# Patient Record
Sex: FEMALE | Race: BLACK OR AFRICAN AMERICAN | NOT HISPANIC OR LATINO | URBAN - METROPOLITAN AREA
[De-identification: names, ages, dates, MRNs, and addresses within clinical notes are randomized per-mention and may not be internally consistent; named-entity substitution may affect disease eponyms.]

---

## 2024-02-28 ENCOUNTER — HOSPITAL ENCOUNTER (EMERGENCY)
Facility: HOSPITAL | Age: 34
Discharge: HOME | End: 2024-02-28
Payer: COMMERCIAL

## 2024-02-28 ENCOUNTER — APPOINTMENT (OUTPATIENT)
Dept: RADIOLOGY | Facility: HOSPITAL | Age: 34
End: 2024-02-28
Payer: COMMERCIAL

## 2024-02-28 VITALS
TEMPERATURE: 97.2 F | WEIGHT: 250 LBS | RESPIRATION RATE: 15 BRPM | DIASTOLIC BLOOD PRESSURE: 75 MMHG | SYSTOLIC BLOOD PRESSURE: 121 MMHG | HEART RATE: 74 BPM | OXYGEN SATURATION: 99 %

## 2024-02-28 DIAGNOSIS — O20.0 THREATENED MISCARRIAGE IN EARLY PREGNANCY (HHS-HCC): ICD-10-CM

## 2024-02-28 DIAGNOSIS — N93.9 ABNORMAL UTERINE BLEEDING: Primary | ICD-10-CM

## 2024-02-28 LAB
ABO GROUP (TYPE) IN BLOOD: NORMAL
ALBUMIN SERPL BCP-MCNC: 4.2 G/DL (ref 3.4–5)
ALP SERPL-CCNC: 60 U/L (ref 33–110)
ALT SERPL W P-5'-P-CCNC: 13 U/L (ref 7–45)
ANION GAP SERPL CALC-SCNC: 14 MMOL/L (ref 10–20)
APPEARANCE UR: ABNORMAL
AST SERPL W P-5'-P-CCNC: 19 U/L (ref 9–39)
B-HCG SERPL-ACNC: <3 MIU/ML
BILIRUB SERPL-MCNC: 0.3 MG/DL (ref 0–1.2)
BILIRUB UR STRIP.AUTO-MCNC: NEGATIVE MG/DL
BUN SERPL-MCNC: 8 MG/DL (ref 6–23)
CALCIUM SERPL-MCNC: 9.7 MG/DL (ref 8.6–10.6)
CHLORIDE SERPL-SCNC: 106 MMOL/L (ref 98–107)
CO2 SERPL-SCNC: 26 MMOL/L (ref 21–32)
COLOR UR: COLORLESS
CREAT SERPL-MCNC: 0.67 MG/DL (ref 0.5–1.05)
EGFRCR SERPLBLD CKD-EPI 2021: >90 ML/MIN/1.73M*2
GLUCOSE SERPL-MCNC: 84 MG/DL (ref 74–99)
GLUCOSE UR STRIP.AUTO-MCNC: NORMAL MG/DL
KETONES UR STRIP.AUTO-MCNC: NEGATIVE MG/DL
LEUKOCYTE ESTERASE UR QL STRIP.AUTO: ABNORMAL
MUCOUS THREADS #/AREA URNS AUTO: ABNORMAL /LPF
NITRITE UR QL STRIP.AUTO: NEGATIVE
PH UR STRIP.AUTO: 6.5 [PH]
POTASSIUM SERPL-SCNC: 4.8 MMOL/L (ref 3.5–5.3)
PROT SERPL-MCNC: 7.8 G/DL (ref 6.4–8.2)
PROT UR STRIP.AUTO-MCNC: NEGATIVE MG/DL
RBC # UR STRIP.AUTO: ABNORMAL /UL
RBC #/AREA URNS AUTO: >20 /HPF
RH FACTOR (ANTIGEN D): NORMAL
SODIUM SERPL-SCNC: 141 MMOL/L (ref 136–145)
SP GR UR STRIP.AUTO: 1.01
SQUAMOUS #/AREA URNS AUTO: ABNORMAL /HPF
UROBILINOGEN UR STRIP.AUTO-MCNC: NORMAL MG/DL
WBC #/AREA URNS AUTO: ABNORMAL /HPF

## 2024-02-28 PROCEDURE — 99284 EMERGENCY DEPT VISIT MOD MDM: CPT | Performed by: PHYSICIAN ASSISTANT

## 2024-02-28 PROCEDURE — 36415 COLL VENOUS BLD VENIPUNCTURE: CPT | Performed by: PHYSICIAN ASSISTANT

## 2024-02-28 PROCEDURE — 81001 URINALYSIS AUTO W/SCOPE: CPT | Performed by: PHYSICIAN ASSISTANT

## 2024-02-28 PROCEDURE — 84702 CHORIONIC GONADOTROPIN TEST: CPT | Performed by: PHYSICIAN ASSISTANT

## 2024-02-28 PROCEDURE — 80053 COMPREHEN METABOLIC PANEL: CPT | Performed by: PHYSICIAN ASSISTANT

## 2024-02-28 PROCEDURE — 76830 TRANSVAGINAL US NON-OB: CPT

## 2024-02-28 PROCEDURE — 76830 TRANSVAGINAL US NON-OB: CPT | Performed by: RADIOLOGY

## 2024-02-28 PROCEDURE — 2500000004 HC RX 250 GENERAL PHARMACY W/ HCPCS (ALT 636 FOR OP/ED): Performed by: PHYSICIAN ASSISTANT

## 2024-02-28 PROCEDURE — 86901 BLOOD TYPING SEROLOGIC RH(D): CPT | Performed by: PHYSICIAN ASSISTANT

## 2024-02-28 PROCEDURE — 99284 EMERGENCY DEPT VISIT MOD MDM: CPT | Mod: 25

## 2024-02-28 RX ADMIN — SODIUM CHLORIDE 1000 ML: 0.9 INJECTION, SOLUTION INTRAVENOUS at 17:10

## 2024-02-28 ASSESSMENT — COLUMBIA-SUICIDE SEVERITY RATING SCALE - C-SSRS
6. HAVE YOU EVER DONE ANYTHING, STARTED TO DO ANYTHING, OR PREPARED TO DO ANYTHING TO END YOUR LIFE?: NO
2. HAVE YOU ACTUALLY HAD ANY THOUGHTS OF KILLING YOURSELF?: NO
1. IN THE PAST MONTH, HAVE YOU WISHED YOU WERE DEAD OR WISHED YOU COULD GO TO SLEEP AND NOT WAKE UP?: NO

## 2024-02-28 ASSESSMENT — PAIN - FUNCTIONAL ASSESSMENT: PAIN_FUNCTIONAL_ASSESSMENT: 0-10

## 2024-02-28 ASSESSMENT — PAIN DESCRIPTION - LOCATION: LOCATION: ABDOMEN

## 2024-02-28 ASSESSMENT — PAIN SCALES - GENERAL: PAINLEVEL_OUTOF10: 10 - WORST POSSIBLE PAIN

## 2024-02-28 ASSESSMENT — PAIN DESCRIPTION - ORIENTATION: ORIENTATION: LOWER

## 2024-02-28 NOTE — ED PROVIDER NOTES
HPI   Chief Complaint   Patient presents with   • Pregnancy Problem       HPI This is a 33-year-old female who is  4 para 2 by  1 therapeutic  currently approximately 9 weeks pregnant by dates as her last menstrual cycle was somewhere around  to last year.  She states a few weeks ago she had some spotting was checked out at a clinic and was post to follow-up with OB.  She has not made an appointment yet.  She states today while she was showering she noticed large clots and became concerned.  She has had abdominal cramping as well.  No nausea or vomiting she is taking some over-the-counter prenatal vitamins.  No chest pain or shortness of breath no numbness tingling or weakness                    [place] Coma Scale Score: 15                     Patient History   No past medical history on file.  No past surgical history on file.  No family history on file.  Social History     Tobacco Use   • Smoking status: Not on file   • Smokeless tobacco: Not on file   Substance Use Topics   • Alcohol use: Not on file   • Drug use: Not on file       Physical Exam   ED Triage Vitals [24 1606]   Temperature Heart Rate Respirations BP   36.2 °C (97.2 °F) 74 15 121/75      Pulse Ox Temp Source Heart Rate Source Patient Position   99 % Temporal -- --      BP Location FiO2 (%)     -- --       Physical Exam  Family history, social history, and allergies reviewed    REVIEW OF SYSTEMS:    GENERAL.: No unexplained weight loss, fatigue, anorexia, insomnia, fever.    EYES: No vision loss, double vision, drainage, eye pain.    ENT: No pharyngitis, dry mouth.    CARDIOPULMONARY: No chest pain, palpitations, syncope, near syncope. No shortness of breath, cough, hemoptysis.    GI: + crampy abdominal pain, change in bowel habits, melena, hematemesis, hematochezia, nausea, vomiting, diarrhea.    : + bleeding clots ;NO  discharge, dysuria, frequency, urgency, hematuria.    MS: No limb pain, joint pain,  joint swelling.    SKIN: No rashes.    Review of systems is otherwise negative unless stated above or in history of present illness.    FEMALE :    GENERAL: Vitals noted, no distress.  Normocephalic atraumatic.    EENT:  Eyes unremarkable. Posterior oropharynx unremarkable.    CARDIAC: Regular, rate, rhythm.  No murmurs rubs or gallops.  No JVD    PULMONARY: Lungs clear bilaterally with good aeration. No wheezes rales or rhonchi.  No respiratory distress    ABDOMEN: Soft, nontender, nonsurgical. No peritoneal signs. Normoactive bowel sounds.    PELVIC EXAM:  Speculum exam shows approx 5cc blood with clot: NO  discharge, ulcerations, lesions. Bimanual exam shows no adnexal pain or mass. No cervical motion tenderness. EGBUS: WNL Nurse present throughout exam.     EXTREMITIES: No peripheral edema. Negative Homans bilaterally.    SKIN: No rash.    NEURO: No focal neurologic deficits.    MEDICAL DECSION MAKING:     ED Course & MDM   Diagnoses as of 02/28/24 1826   Threatened miscarriage in early pregnancy   Pelvic, labs, iv, transvag ultrasound, gc chlam wet prep    Medical Decision Making  Case will be signed out to Griffin Frank PA-C for final dispo    Procedure  Procedures     Yasmine Marin PA-C  02/28/24 1826

## 2024-02-28 NOTE — ED TRIAGE NOTES
8 wks OB, c/o lower abd pain, constant x3 days. Also reports vaginal bleeding, <1 pad/hr. She is also passing large clots.

## 2024-02-29 NOTE — PROGRESS NOTES
This patient was handed off to me at none.  For more extensive history, physical exam, and MDM, please see previous providers note.  Briefly, this is a 33-year-old female G4, P2 who presented to the emergency room for a 1 day history of vaginal bleeding.  Patient soaked through 1 pad today and states that the bleeding has improved.  Patient was handed off to me with lab work and transvaginal ultrasound still pending.  On my reevaluation, patient has no new complaints.  I will continue to monitor.  Patient is Rh+.  Quantitative hCG was less than 3 and this is consistent with a completed miscarriage.  Transvaginal ultrasound was unremarkable.  I did describe the patient all non-emergent findings and she agreed to follow-up with a primary care provider soon as possible.  Given the quantitative hCG is negative, patient likely has started her menstrual cycle.  CBC is not indicated at this time as patient has only soaked through 1 pad today and patient is not significantly bleeding.  Patient will be discharged home with strict return precautions.  Patient was agreeable to this plan had no further questions.  I did provide patient with the number for the [facility] for outpatient follow-up.  Patient will be discharged home.  Patient understands that if symptoms worsen or change in any way, they should return for immediate medical attention.  Patient understands that they should follow-up with their primary care physician within the next week to follow-up for abnormal uterine bleeding.  Patient was stable upon discharge.